# Patient Record
Sex: MALE | Race: WHITE | NOT HISPANIC OR LATINO | Employment: FULL TIME | URBAN - METROPOLITAN AREA
[De-identification: names, ages, dates, MRNs, and addresses within clinical notes are randomized per-mention and may not be internally consistent; named-entity substitution may affect disease eponyms.]

---

## 2023-10-09 ENCOUNTER — OFFICE VISIT (OUTPATIENT)
Dept: URGENT CARE | Facility: PHYSICIAN GROUP | Age: 28
End: 2023-10-09
Payer: COMMERCIAL

## 2023-10-09 ENCOUNTER — HOSPITAL ENCOUNTER (OUTPATIENT)
Dept: RADIOLOGY | Facility: MEDICAL CENTER | Age: 28
End: 2023-10-09
Attending: PHYSICIAN ASSISTANT
Payer: COMMERCIAL

## 2023-10-09 VITALS
WEIGHT: 238 LBS | HEIGHT: 74 IN | DIASTOLIC BLOOD PRESSURE: 82 MMHG | TEMPERATURE: 99.9 F | SYSTOLIC BLOOD PRESSURE: 124 MMHG | RESPIRATION RATE: 20 BRPM | HEART RATE: 84 BPM | BODY MASS INDEX: 30.54 KG/M2 | OXYGEN SATURATION: 95 %

## 2023-10-09 DIAGNOSIS — M79.671 RIGHT FOOT PAIN: ICD-10-CM

## 2023-10-09 DIAGNOSIS — S90.31XA CONTUSION OF RIGHT FOOT, INITIAL ENCOUNTER: ICD-10-CM

## 2023-10-09 PROCEDURE — 99204 OFFICE O/P NEW MOD 45 MIN: CPT | Performed by: PHYSICIAN ASSISTANT

## 2023-10-09 PROCEDURE — 73630 X-RAY EXAM OF FOOT: CPT | Mod: RT

## 2023-10-09 PROCEDURE — 3074F SYST BP LT 130 MM HG: CPT | Performed by: PHYSICIAN ASSISTANT

## 2023-10-09 PROCEDURE — 3079F DIAST BP 80-89 MM HG: CPT | Performed by: PHYSICIAN ASSISTANT

## 2023-10-10 NOTE — PROGRESS NOTES
"  Subjective:   Sheldon Baum is a 27 y.o. male who presents today with   Chief Complaint   Patient presents with    Foot Injury     Right son dropped dumbbell saturday  10/7/23        Foot Problem  This is a new problem. Episode onset: 2 days. The problem occurs constantly. The problem has been gradually worsening. The symptoms are aggravated by bending (walking, weightbearing). He has tried rest and ice for the symptoms. The treatment provided mild relief.     Patient states his son dropped a 50 pound dumbbell on his barefoot in a concrete garage on accident.    PMH:  has a past medical history of Pain.  MEDS: No current outpatient medications on file.  ALLERGIES:   Allergies   Allergen Reactions    Betadine [Povidone Iodine] Rash     .     SURGHX:   Past Surgical History:   Procedure Laterality Date    KNEE ARTHROSCOPY  1/21/2015    Performed by Jonas Vicente M.D. at SURGERY [facility]    MENISCECTOMY  1/21/2015    Performed by Jonas Vicente M.D. at SURGERY [facility]    KNEE ARTHROSCOPY  6/2014    left    SHOULDER ARTHROSCOPY  2013    left    ORIF, ELBOW  2011    right    KNEE ARTHROSCOPY  2011    right     SOCHX:  reports that he has never smoked. He does not have any smokeless tobacco history on file. He reports current alcohol use. He reports that he does not use drugs.  FH: Reviewed with patient, not pertinent to this visit.       Review of Systems   Musculoskeletal:         Right foot pain        Objective:   /82   Pulse 84   Temp 37.7 °C (99.9 °F) (Temporal)   Resp 20   Ht 1.88 m (6' 2\")   Wt 108 kg (238 lb)   SpO2 95%   BMI 30.56 kg/m²   Physical Exam  Vitals and nursing note reviewed.   Constitutional:       General: He is not in acute distress.     Appearance: Normal appearance. He is well-developed. He is not ill-appearing or toxic-appearing.   HENT:      Head: Normocephalic and atraumatic.      Right Ear: Hearing normal.      Left Ear: Hearing normal.   Cardiovascular:      " Rate and Rhythm: Normal rate.   Pulmonary:      Effort: Pulmonary effort is normal.   Musculoskeletal:        Legs:       Comments: Tenderness to palpation to the dorsum of the right foot.  No open lesions.  No erythema.  There is bruising to the area.  Slightly decreased sensation distally.  Less than 2 capillary fill.  Decreased movement and unable to weight-bear secondary to discomfort.  Patient is ambulating by walking on his heel.   Skin:     General: Skin is warm and dry.   Neurological:      Mental Status: He is alert.      Coordination: Coordination normal.   Psychiatric:         Mood and Affect: Mood normal.       DX FOOT    FINDINGS:     BONE MINERALIZATION: Normal.  JOINTS: Preserved. No erosions.  FRACTURE: None.  DISLOCATION: None.  SOFT TISSUES: No mass.     IMPRESSION:     No acute osseous abnormality.    Assessment/Plan:   Assessment    1. Right foot pain  - DX-FOOT-COMPLETE 3+ RIGHT; Future  - Referral to Sports Medicine    2. Contusion of right foot, initial encounter    Symptoms and presentation consistent with contusion of the right foot.  Would recommend following up if symptoms persist over the next week to 2 weeks with sports medicine.  Consistent with contusion of the right foot.  Patient was provided with a tall walking boot at this time and also offered crutches but he declines.  Recommend continue with rest, ice, compression and elevation.  Return sooner with any new or worsening symptoms.    Differential diagnosis, natural history, supportive care, and indications for immediate follow-up discussed.   Patient given instructions and understanding of medications and treatment.    If not improving in 3-5 days, F/U with PCP or return to  if symptoms worsen.    Patient agreeable to plan.      Please note that this dictation was created using voice recognition software. I have made every reasonable attempt to correct obvious errors, but I expect that there are errors of grammar and possibly  content that I did not discover before finalizing the note.    Jm Liz PA-C

## 2023-10-11 ENCOUNTER — TELEPHONE (OUTPATIENT)
Dept: HEALTH INFORMATION MANAGEMENT | Facility: OTHER | Age: 28
End: 2023-10-11
Payer: COMMERCIAL